# Patient Record
Sex: MALE | Race: WHITE | NOT HISPANIC OR LATINO | Employment: FULL TIME | URBAN - METROPOLITAN AREA
[De-identification: names, ages, dates, MRNs, and addresses within clinical notes are randomized per-mention and may not be internally consistent; named-entity substitution may affect disease eponyms.]

---

## 2021-03-31 NOTE — TELEPHONE ENCOUNTER
MEDICAL RECORDS REQUEST   [facility]  Urology Clinic  [address] [place] [address]  PHONE: [phone]  Fax: [phone]        FUTURE VISIT INFORMATION                                                   Thomas Winstonsj, : 1965 scheduled for future visit at [facility]    APPOINTMENT INFORMATION:    Date: 5/3/21    Provider:  Neo LEWIS    Reason for Visit/Diagnosis: Artificial Urinary Sphincter (AUS)    REFERRAL INFORMATION:    Referring provider:  Self    Specialty: N/A    Referring providers clinic:  N/A    Clinic contact number:  N/A    RECORDS REQUESTED FOR VISIT                                                     NOTES  STATUS/DETAILS   OFFICE NOTE from referring provider  no   OFFICE NOTE from other specialist  yes   DISCHARGE SUMMARY from hospital  yes   DISCHARGE REPORT from the ER  yes   OPERATIVE REPORT  Yes    MEDICATION LIST  Yes    PROSTATE CANCER     CT ABD PELVIS   Yes- 20, 20   BONE SCAN  Yes- 20   MRI OF PROSTATE  no   PATHOLOGY REPORT & SLIDES  Yes- CE 21   PSA (LAB)  yes     PRE-VISIT CHECKLIST      Record collection complete YES - Recs in CE to review   Images in  PACS     If no, please explain: CSS faxed to  to obtain recs/IMGS -3/31   Appointment appropriately scheduled           (right time/right provider) Yes   MyChart activation If no, please explain: in process    Questionnaire complete If no, please explain: In process      Completed by: Kelley Goldstein

## 2021-04-22 ENCOUNTER — PRE VISIT (OUTPATIENT)
Dept: UROLOGY | Facility: CLINIC | Age: 56
End: 2021-04-22

## 2021-04-22 NOTE — TELEPHONE ENCOUNTER
Reason for visit: AUS second opinion consult     Relevant information: retropubic prostatectomy, radiation    Problem list There is no problem list on file for this patient.      Records/imaging/labs/orders: records available    Pt called: yes, message left offering pt a video visit    At Rooming: standard

## 2021-04-26 ENCOUNTER — VIRTUAL VISIT (OUTPATIENT)
Dept: UROLOGY | Facility: CLINIC | Age: 56
End: 2021-04-26
Payer: COMMERCIAL

## 2021-04-26 DIAGNOSIS — N39.3 MALE URINARY STRESS INCONTINENCE: ICD-10-CM

## 2021-04-26 DIAGNOSIS — C61 MALIGNANT NEOPLASM OF PROSTATE (H): ICD-10-CM

## 2021-04-26 DIAGNOSIS — N32.0 BLADDER NECK CONTRACTURE: Primary | ICD-10-CM

## 2021-04-26 PROCEDURE — 99204 OFFICE O/P NEW MOD 45 MIN: CPT | Mod: 95 | Performed by: UROLOGY

## 2021-04-26 NOTE — NURSING NOTE
Chief Complaint   Patient presents with     Consult     Discuss AUS       No Known Allergies    No current outpatient medications on file.       Social History     Tobacco Use     Smoking status: Never Smoker     Smokeless tobacco: Never Used   Substance Use Topics     Alcohol use: None     Drug use: None       Stephanie Castillo LPN  4/26/2021  6:52 AM

## 2021-04-26 NOTE — LETTER
April 26, 2021       TO: Thomas Simon  [address] [place] [id number]       DearMr.Alfred,    We are writing to inform you of your test results.    {[facility] results letter list:[id number]}    No results found from the In Basket message.    ***

## 2021-04-26 NOTE — PATIENT INSTRUCTIONS
Follow up with Dr. Larson in June/July for a cystoscopy.      It was a pleasure meeting with you today.  Thank you for allowing me and my team the privilege of caring for you today.  YOU are the reason we are here, and I truly hope we provided you with the excellent service you deserve.  Please let us know if there is anything else we can do for you so that we can be sure you are leaving completely satisfied with your care experience.

## 2021-04-26 NOTE — PROGRESS NOTES
HPI:  Thomas Simon is a 56 year old male being seen for male VIKY after RP+RT (PSA 15; April 2020;[facility]) with LIZET and positive margins now with biopsy proven recurrence. Now on Lupron with undetectable PSA.  Had dilation and TUIBN of VUAS in Jan and Feb of this year. Complete incontinence. Was doing well with incontinence right after surgery but got incontinent after the RT and had to start doing self dilation for VUAS. No longer doing self dilation. Had a cysto with Ector in March 16, 2021 with open bladder neck with some dystrophic calcification but able to get the scope through. Has an AUS set up with him for May.     The clamp keeps him dry 75% of the time, unless he bends over.     Reviewed previous notes from Dr. Ku and Ector as above.     Exam:  There were no vitals taken for this visit.  GENERAL: Healthy, alert and no distress  EYES: Eyes grossly normal to inspection.  No discharge or erythema, or obvious scleral/conjunctival abnormalities.  RESP: No audible wheeze, cough, or visible cyanosis.  No visible retractions or increased work of breathing.    SKIN: Visible skin clear. No significant rash, abnormal pigmentation or lesions.  NEURO: Cranial nerves grossly intact.  Mentation and speech appropriate for age.  PSYCH: Mentation appears normal, affect normal/bright, judgement and insight intact, normal speech and appearance well-groomed.    Review of Imaging:  The following imaging exams were independently viewed and interpreted by me and discussed with patient:  CT Scan Abd/Pelvis: Normal 12/28/2020  Bone Scan neg 4/9/2020    Review of Labs:  The following labs were reviewed by me and discussed with the patient:  Creatinine: normal in summer  PSA undetectable      Assessment & Plan     Prostate cancer (chronic illness with side effects from treatment including VUAS and VIKY)-- on Lupron now with undetectable PSA; no change planned.  Vesicourethral anastomotic stenosis -- mild recurrence noted by  [facility] one month after most recent TUI in Feb. I advised patient to repeat cysto 4-6 months after last TUI to confirm stable VUAS before proceeding with AUS  Male VIKY -- could benefit from AUS but in my opinion needs a stably open VUAS for 6 months first. He understands the risks of AUS placement to include but not be limited to bleeding, infection, penile or scrotal pain/numbness, device infection or erosion, urethral atrophy and need for revision or replacement of the artifical sphincter (25%). He understands that I have consulted for [employer], the  of the device.We discussed that his risks would be elevated compared to the average patient given his prior radiation.    He has elected to postpone the May AUS and do a cysto this summer.     Requests disability paperwork and I asked him to reach out to Dr Ku or his PCP Dr Benz for that.     Jack Larson MD  [facility] UROLOGY [facility]          ==========================    Video Visit Technology for this patient: Bronwyn Video Visit- Patient was left in waiting room    Thomas is a 56 year old who is being evaluated via a billable video visit.      How would you like to obtain your AVS? Mail a copy  If the video visit is dropped, the invitation should be resent by: Text to cell phone: [phone]  Will anyone else be joining your video visit? No    Video Start Time: 7:00  Video-Visit Details    Type of service:  Video Visit    Video End Time:7:31 AM    Originating Location (pt. Location): Home    Distant Location (provider location):  [facility] UROLOGY [facility]     Platform used for Video Visit: Bronwyn

## 2021-04-26 NOTE — LETTER
4/26/2021       RE: Thomas Simon  [address] [place] [id number]     Dear Colleague,    Thank you for referring your patient, Thomas Simon, to the [facility] UROLOGY CLINIC [place] [facility]. Please see a copy of my visit note below.    HPI:  Thomas Simon is a 56 year old male being seen for male VIKY after RP+RT (PSA 15; April 2020;[place]) with LIZET and positive margins now with biopsy proven recurrence. Now on Lupron with undetectable PSA.  Had dilation and TUIBN of VUAS in Jan and Feb of this year. Complete incontinence. Was doing well with incontinence right after surgery but got incontinent after the RT and had to start doing self dilation for VUAS. No longer doing self dilation. Had a cysto with Ector in March 16, 2021 with open bladder neck with some dystrophic calcification but able to get the scope through. Has an AUS set up with him for May.     The clamp keeps him dry 75% of the time, unless he bends over.     Reviewed previous notes from Dr. Ku and Ector as above.     Exam:  There were no vitals taken for this visit.  GENERAL: Healthy, alert and no distress  EYES: Eyes grossly normal to inspection.  No discharge or erythema, or obvious scleral/conjunctival abnormalities.  RESP: No audible wheeze, cough, or visible cyanosis.  No visible retractions or increased work of breathing.    SKIN: Visible skin clear. No significant rash, abnormal pigmentation or lesions.  NEURO: Cranial nerves grossly intact.  Mentation and speech appropriate for age.  PSYCH: Mentation appears normal, affect normal/bright, judgement and insight intact, normal speech and appearance well-groomed.    Review of Imaging:  The following imaging exams were independently viewed and interpreted by me and discussed with patient:  CT Scan Abd/Pelvis: Normal 12/28/2020  Bone Scan neg 4/9/2020    Review of Labs:  The following labs were reviewed by me and  discussed with the patient:  Creatinine: normal in summer  PSA undetectable      Assessment & Plan     Prostate cancer (chronic illness with side effects from treatment including VUAS and VIKY)-- on Lupron now with undetectable PSA; no change planned.  Vesicourethral anastomotic stenosis -- mild recurrence noted by Ector one month after most recent TUI in Feb. I advised patient to repeat cysto 4-6 months after last TUI to confirm stable VUAS before proceeding with AUS  Male VIKY -- could benefit from AUS but in my opinion needs a stably open VUAS for 6 months first. He understands the risks of AUS placement to include but not be limited to bleeding, infection, penile or scrotal pain/numbness, device infection or erosion, urethral atrophy and need for revision or replacement of the artifical sphincter (25%). He understands that I have consulted for [employer], the  of the device.We discussed that his risks would be elevated compared to the average patient given his prior radiation.    He has elected to postpone the May AUS and do a cysto this summer.     Requests disability paperwork and I asked him to reach out to Dr Ku or his PCP Dr Benz for that.     Jack Larson MD  [facility] UROLOGY [facility]    ==========================    Video Visit Technology for this patient: Well Video Visit- Patient was left in waiting room    Thomas is a 56 year old who is being evaluated via a billable video visit.      How would you like to obtain your AVS? Mail a copy  If the video visit is dropped, the invitation should be resent by: Text to cell phone: [phone]  Will anyone else be joining your video visit? No    Video Start Time: 7:00  Video-Visit Details    Type of service:  Video Visit    Video End Time:7:31 AM    Originating Location (pt. Location): Home    Distant Location (provider location):  [facility]OLOG[facility]     Platform used for  Video Visit: Bronwyn

## 2021-05-03 ENCOUNTER — PRE VISIT (OUTPATIENT)
Dept: UROLOGY | Facility: CLINIC | Age: 56
End: 2021-05-03

## 2021-07-15 ENCOUNTER — PRE VISIT (OUTPATIENT)
Dept: UROLOGY | Facility: CLINIC | Age: 56
End: 2021-07-15

## 2021-07-15 NOTE — TELEPHONE ENCOUNTER
Reason for visit: Cystoscopy      Relevant information: AUS planning    Problem list There is no problem list on file for this patient.      Records/imaging/labs/orders: all records available    Pt called: no need for a call    At Rooming: standard

## 2021-07-19 ENCOUNTER — OFFICE VISIT (OUTPATIENT)
Dept: UROLOGY | Facility: CLINIC | Age: 56
End: 2021-07-19
Payer: COMMERCIAL

## 2021-07-19 VITALS — DIASTOLIC BLOOD PRESSURE: 85 MMHG | HEART RATE: 89 BPM | SYSTOLIC BLOOD PRESSURE: 132 MMHG

## 2021-07-19 DIAGNOSIS — N39.3 MALE URINARY STRESS INCONTINENCE: Primary | ICD-10-CM

## 2021-07-19 DIAGNOSIS — Z01.812 PRE-PROCEDURE LAB EXAM: Primary | ICD-10-CM

## 2021-07-19 PROCEDURE — 99207 PR NO CHARGE NURSE ONLY: CPT

## 2021-07-19 PROCEDURE — 52000 CYSTOURETHROSCOPY: CPT | Performed by: UROLOGY

## 2021-07-19 RX ORDER — CEFAZOLIN SODIUM 2 G/50ML
2 SOLUTION INTRAVENOUS
Status: CANCELLED | OUTPATIENT
Start: 2021-07-19

## 2021-07-19 RX ORDER — CEFAZOLIN SODIUM 2 G/50ML
2 SOLUTION INTRAVENOUS SEE ADMIN INSTRUCTIONS
Status: CANCELLED | OUTPATIENT
Start: 2021-07-19

## 2021-07-19 ASSESSMENT — PAIN SCALES - GENERAL: PAINLEVEL: NO PAIN (0)

## 2021-07-19 NOTE — NURSING NOTE
Pre Op Teaching Flowsheet                                        Pre and Post op Patient Education  Relevant Diagnosis: stress incontinentence   Teaching Topic:  Pre and post op teaching for AUS  Person Involved in teaching:patient      Motivation Level: High  Asks Questions: Yes  Eager to Learn:  Yes  Cooperative: Yes  Receptive (willing/able to accept information):  Yes  Patient demonstrates understanding of the following:  Date and time of surgery:  09/30/21  [facility]  History and Physical and any other testing necessary prior to surgery [facility]   Required time line for completion of History and Physical and any pre-op testing: No more than 30 days prior to surgery date    NPO Guidelines: NPO per Anesthesia Guidelines : Reviewed (surgery packet for further information).    Patient demonstrates understanding of the following:  Patient understands the need for a responsible adult to drive them home and someone to stay with them for the first 24 hours post-operatively: Wife  Pre-op bowel prep: N/A  Pre-op showering/scrub information with Hibiclens Soap: Yes given soap  Medications to take the day of surgery: Pre op Physical for instruction.   Blood thinner medications discussed and when to stop (if applicable):  Yes  Diabetes medication management (if applicable):  Patient to discuss with Primary Care Provider  Discussed pain control after surgery: pain scale, pain medications and pain management techniques  Infection Prevention: Patient demonstrates understanding of the following:  Patient instructed on hand hygiene:  Yes  Surgical procedure site care taught: Yes  Signs and symptoms of infection taught:  Yes  Wound care will be taught at the time of discharge.    Urine anylisis and Urine Culture [facility]  Pre op Covid testing on:TBD  Post-op follow-up:3-4 weeks for activation  Discussed how to contact the hospital, nurse, and clinic scheduling staff if  necessary.     Surgical instructions given to patient in clinic:in clinic to call with surgery date.   Instructional materials used/given/mailed: Before your surgery packet , Medications to avoid before surgery , Showering or Bathing instructions before surgery  and What to expect after surgery    Total time with patient: 10 minutes   Janneth Dailey RN

## 2021-07-19 NOTE — NURSING NOTE
Chief Complaint   Patient presents with     Cystoscopy     incontinence, AUS planning       Blood pressure 132/85, pulse 89. There is no height or weight on file to calculate BMI.    There is no problem list on file for this patient.      No Known Allergies    No current outpatient medications on file.       Social History     Tobacco Use     Smoking status: Never Smoker     Smokeless tobacco: Never Used   Substance Use Topics     Alcohol use: None     Drug use: None       Ale Sparrow CMA  2021  4:09 PM     Invasive Procedure Safety Checklist:    Procedure: Cystoscopy     Action: Complete sections and checkboxes as appropriate.    Pre-procedure:  1. Patient ID Verified with 2 identifiers ([employer] and  or MRN) : YES    2. Procedure and site verified with patient/designee (when able) : YES    3. Accurate consent documentation in medical record : YES    4. H&P (or appropriate assessment) documented in medical record : YES  H&P must be up to 30 days prior to procedure an updated within 24 hours of                 Procedure as applicable.     5. Relevant diagnostic and radiology test results appropriately labeled and displayed as applicable : YES    6. Blood products, implants, devices, and/or special equipment available for the procedure as applicable : YES    7. Procedure site(s) marked with provider initials [Exclusions: None] : NO    8. Marking not required. Reason : Yes  Procedure does not require site marking    Time Out:     Time-Out performed immediately prior to starting procedure, including verbal and active participation of all team members addressing: YES    1. Correct patient identity.  2. Confirmed that the correct side and site are marked.  3. An accurate procedure to be done.  4. Agreement on the procedure to be done.  5. Correct patient position.  6. Relevant images and results are properly labeled and appropriately displayed.  7. The need to administer antibiotics or fluids for irrigation  purposes during the procedure as applicable.  8. Safety precautions based on patient history or medication use.    During Procedure: Verification of correct person, site, and procedure occurs any time the responsibility for care of the patient is transferred to another member of the care team.    No medications administered during this procedure.    Ale Sparrow CMA  July 19, 2021

## 2021-07-19 NOTE — PROGRESS NOTES
Male Cystoscopy Procedure Note     PRE-PROCEDURE DIAGNOSIS: male stress urinary incontinence and h/o bladder neck contracture  POST-PROCEDURE DIAGNOSIS: male stress urinary incontinence and h/o bladder neck contracture  PROCEDURE: cystoscopy    HISTORY: Thomas Simon is a 56 year old man with male stress urinary incontinence and h/o bladder neck contracture.     REVIEW OF OFFICE STUDIES:    none    DESCRIPTION OF PROCEDURE:  After informed consent was obtained, the patient was brought to the procedure room where he was placed in the supine position with all pressure points well padded.  The penis and scrotum were prepped and draped in a sterile fashion. A flexible cystoscope was introduced through a well-lubricated urethra.  The anterior urethra was free of stricture. The urinary sphincter was very weak. The prostate demonstrated removal. Bladder neck was open to 14F. There was very mild dystrophic calcification .The flexible cystoscope was removed and the findings were described to the patient.   ASSESSMENT AND PLAN:  56 year old man with male VIKY. The BNC appears stable and reasonably open.  Okay to proceed with AUS.  He understands the risks of AUS placement to include but not be limited to bleeding, infection, penile or scrotal pain/numbness, device infection or erosion, urethral atrophy and need for revision or replacement of the artifical sphincter (25%). He understands that I have consulted for [employer], the  of the device.

## 2021-07-19 NOTE — LETTER
7/19/2021       RE: Thomas Simon  [address] [address]     Dear Colleague,    Thank you for referring your patient, Thomas Simon, to the [facility]OLOG[facility] CLINIC [facility]. Please see a copy of my visit note below.      Male Cystoscopy Procedure Note     PRE-PROCEDURE DIAGNOSIS: male stress urinary incontinence and h/o bladder neck contracture  POST-PROCEDURE DIAGNOSIS: male stress urinary incontinence and h/o bladder neck contracture  PROCEDURE: cystoscopy    HISTORY: Thomas Simon is a 56 year old man with male stress urinary incontinence and h/o bladder neck contracture.     REVIEW OF OFFICE STUDIES:    none    DESCRIPTION OF PROCEDURE:  After informed consent was obtained, the patient was brought to the procedure room where he was placed in the supine position with all pressure points well padded.  The penis and scrotum were prepped and draped in a sterile fashion. A flexible cystoscope was introduced through a well-lubricated urethra.  The anterior urethra was free of stricture. The urinary sphincter was very weak. The prostate demonstrated removal. Bladder neck was open to 14F. There was very mild dystrophic calcification .The flexible cystoscope was removed and the findings were described to the patient.   ASSESSMENT AND PLAN:  56 year old man with male VIKY. The BNC appears stable and reasonably open.  Okay to proceed with AUS.  He understands the risks of AUS placement to include but not be limited to bleeding, infection, penile or scrotal pain/numbness, device infection or erosion, urethral atrophy and need for revision or replacement of the artifical sphincter (25%). He understands that I have consulted for [employer], the  of the device.      Again, thank you for allowing me to participate in the care of your patient.      Sincerely,    Jack Larson MD

## 2021-07-21 DIAGNOSIS — Z11.59 ENCOUNTER FOR SCREENING FOR OTHER VIRAL DISEASES: ICD-10-CM

## 2021-07-21 PROBLEM — N39.3 MALE URINARY STRESS INCONTINENCE: Status: ACTIVE | Noted: 2021-07-21

## 2021-07-24 DIAGNOSIS — Z11.59 ENCOUNTER FOR SCREENING FOR OTHER VIRAL DISEASES: ICD-10-CM

## 2021-07-27 ENCOUNTER — TELEPHONE (OUTPATIENT)
Dept: UROLOGY | Facility: CLINIC | Age: 56
End: 2021-07-27

## 2021-07-27 NOTE — TELEPHONE ENCOUNTER
Patient is scheduled for surgery with Dr. Larson     Spoke with: Dr. Larson     Date of Surgery: Thursday 09/30/21    Location: [place] OR      Informed patient they will need an adult  Yes    Pre op with Provider pedro    H&P: Scheduled with [facility]     Pre-procedure COVID-19 Test: Patient will be calling and scheduling [facility] or a [facility] by his house.     Additional imaging/appointments: 3 week post op in person nicole/Kecia Monday 10/18/21 @ 12:15pm    Surgery packet: patient received packet in person when he had a clinic visit on 07/19/21, patient declined a new surgery packet to be mailed.      Additional comments: patient declined sooner offered surgery dates. Requested end of September as he has previous plans.

## 2021-09-09 ENCOUNTER — PATIENT OUTREACH (OUTPATIENT)
Dept: UROLOGY | Facility: CLINIC | Age: 56
End: 2021-09-09

## 2021-09-09 ENCOUNTER — TELEPHONE (OUTPATIENT)
Dept: UROLOGY | Facility: CLINIC | Age: 56
End: 2021-09-09

## 2021-09-09 NOTE — TELEPHONE ENCOUNTER
HUSSAIN faxed UC orders to [facility] at [phone], on 9/9/21 at 11:30AM per pt request.

## 2021-09-09 NOTE — TELEPHONE ENCOUNTER
----- Message from Janneth Dailey RN sent at 9/9/2021 11:17 AM CDT -----  Regarding: Fax UC order  Please fax order to .    Thank you    Janneth

## 2021-11-08 DIAGNOSIS — Z01.812 PRE-PROCEDURE LAB EXAM: Primary | ICD-10-CM

## 2021-11-30 ENCOUNTER — PATIENT OUTREACH (OUTPATIENT)
Dept: UROLOGY | Facility: CLINIC | Age: 56
End: 2021-11-30
Payer: COMMERCIAL

## 2021-12-05 DIAGNOSIS — Z11.59 ENCOUNTER FOR SCREENING FOR OTHER VIRAL DISEASES: ICD-10-CM

## 2021-12-28 ENCOUNTER — PATIENT OUTREACH (OUTPATIENT)
Dept: UROLOGY | Facility: CLINIC | Age: 56
End: 2021-12-28
Payer: COMMERCIAL

## 2021-12-29 ENCOUNTER — ANESTHESIA EVENT (OUTPATIENT)
Dept: SURGERY | Facility: CLINIC | Age: 56
End: 2021-12-29
Payer: COMMERCIAL

## 2021-12-29 RX ORDER — GABAPENTIN 300 MG/1
300 CAPSULE ORAL ONCE
Status: CANCELLED | OUTPATIENT
Start: 2021-12-29 | End: 2021-12-29

## 2021-12-29 NOTE — ANESTHESIA PREPROCEDURE EVALUATION
Anesthesia Pre-Procedure Evaluation    Patient: Thomas Simon   MRN: [id number] : 1965        Preoperative Diagnosis: Male urinary stress incontinence [N39.3]    Procedure : Procedure(s):  INSERTION, ARTIFICIAL URINARY SPHINCTER          History reviewed. No pertinent past medical history.   History reviewed. No pertinent surgical history.   No Known Allergies   Social History     Tobacco Use     Smoking status: Never Smoker     Smokeless tobacco: Never Used   Substance Use Topics     Alcohol use: Not Currently      Wt Readings from Last 1 Encounters:   No data found for Wt        Anesthesia Evaluation   Pt has had prior anesthetic. Type: General and MAC.        ROS/MED HX  ENT/Pulmonary:  - neg pulmonary ROS     Neurologic:  - neg neurologic ROS     Cardiovascular:  - neg cardiovascular ROS     METS/Exercise Tolerance: 4 - Raking leaves, gardening    Hematologic:  - neg hematologic  ROS     Musculoskeletal:  - neg musculoskeletal ROS     GI/Hepatic:  - neg GI/hepatic ROS     Renal/Genitourinary: Comment: HX of BPH and prostate cancer s/p prostatectomy 2020      Endo:  - neg endo ROS     Psychiatric/Substance Use:  - neg psychiatric ROS     Infectious Disease:  - neg infectious disease ROS     Malignancy:       Other:            Physical Exam    Airway        Mallampati: III   TM distance: > 3 FB   Neck ROM: full   Mouth opening: > 3 cm    Respiratory Devices and Support         Dental  no notable dental history         Cardiovascular          Rhythm and rate: regular and normal     Pulmonary           breath sounds clear to auscultation           OUTSIDE LABS:  CBC: No results found for: WBC, HGB, HCT, PLT  BMP: No results found for: NA, POTASSIUM, CHLORIDE, CO2, BUN, CR, GLC  COAGS: No results found for: PTT, INR, FIBR  POC: No results found for: BGM, HCG, HCGS  HEPATIC: No results found for: ALBUMIN, PROTTOTAL, ALT, AST, GGT, ALKPHOS, BILITOTAL, BILIDIRECT, SAVANNAH  OTHER: No results found for: PH, LACT,  A1C, ASHOK, PHOS, MAG, LIPASE, AMYLASE, TSH, T4, T3, CRP, SED    Anesthesia Plan    ASA Status:  2   NPO Status:  NPO Appropriate    Anesthesia Type: General.     - Airway: ETT   Induction: Intravenous.   Maintenance: Balanced.        Consents    Anesthesia Plan(s) and associated risks, benefits, and realistic alternatives discussed. Questions answered and patient/representative(s) expressed understanding.    - Discussed:     - Discussed with:  Patient      - Extended Intubation/Ventilatory Support Discussed: No.      - Patient is DNR/DNI Status: No    Use of blood products discussed: No .     Postoperative Care    Pain management: Multi-modal analgesia.   PONV prophylaxis: Ondansetron (or other 5HT-3), Dexamethasone or Solumedrol     Comments:                Franklyn Castro Junior, MD

## 2021-12-30 ENCOUNTER — HOSPITAL ENCOUNTER (OUTPATIENT)
Facility: CLINIC | Age: 56
Discharge: HOME | End: 2021-12-30
Attending: UROLOGY | Admitting: UROLOGY
Payer: COMMERCIAL

## 2021-12-30 ENCOUNTER — ANESTHESIA (OUTPATIENT)
Dept: SURGERY | Facility: CLINIC | Age: 56
End: 2021-12-30
Payer: COMMERCIAL

## 2021-12-30 VITALS
BODY MASS INDEX: 39.41 KG/M2 | DIASTOLIC BLOOD PRESSURE: 83 MMHG | HEIGHT: 74 IN | SYSTOLIC BLOOD PRESSURE: 137 MMHG | RESPIRATION RATE: 15 BRPM | TEMPERATURE: 97.7 F | HEART RATE: 104 BPM | WEIGHT: 307.1 LBS | OXYGEN SATURATION: 97 %

## 2021-12-30 DIAGNOSIS — N39.3 MALE URINARY STRESS INCONTINENCE: ICD-10-CM

## 2021-12-30 DIAGNOSIS — N39.3 MALE URINARY STRESS INCONTINENCE: Primary | ICD-10-CM

## 2021-12-30 LAB — GLUCOSE BLDC GLUCOMTR-MCNC: 110 MG/DL (ref 70–99)

## 2021-12-30 PROCEDURE — 250N000025 HC SEVOFLURANE, PER MIN: Performed by: UROLOGY

## 2021-12-30 PROCEDURE — 82962 GLUCOSE BLOOD TEST: CPT

## 2021-12-30 PROCEDURE — 250N000011 HC RX IP 250 OP 636

## 2021-12-30 PROCEDURE — 250N000009 HC RX 250

## 2021-12-30 PROCEDURE — 250N000011 HC RX IP 250 OP 636: Performed by: UROLOGY

## 2021-12-30 PROCEDURE — 258N000003 HC RX IP 258 OP 636

## 2021-12-30 PROCEDURE — 710N000012 HC RECOVERY PHASE 2, PER MINUTE: Performed by: UROLOGY

## 2021-12-30 PROCEDURE — 258N000003 HC RX IP 258 OP 636: Performed by: ANESTHESIOLOGY

## 2021-12-30 PROCEDURE — 360N000076 HC SURGERY LEVEL 3, PER MIN: Performed by: UROLOGY

## 2021-12-30 PROCEDURE — 370N000017 HC ANESTHESIA TECHNICAL FEE, PER MIN: Performed by: UROLOGY

## 2021-12-30 PROCEDURE — 710N000009 HC RECOVERY PHASE 1, LEVEL 1, PER MIN: Performed by: UROLOGY

## 2021-12-30 PROCEDURE — 53445 INSERT URO/VES NCK SPHINCTER: CPT | Mod: GC | Performed by: UROLOGY

## 2021-12-30 PROCEDURE — 999N000141 HC STATISTIC PRE-PROCEDURE NURSING ASSESSMENT: Performed by: UROLOGY

## 2021-12-30 PROCEDURE — 272N000001 HC OR GENERAL SUPPLY STERILE: Performed by: UROLOGY

## 2021-12-30 PROCEDURE — 278N000051 HC OR IMPLANT GENERAL: Performed by: UROLOGY

## 2021-12-30 PROCEDURE — C1815 PROS, URINARY SPH, IMP: HCPCS | Performed by: UROLOGY

## 2021-12-30 PROCEDURE — 250N000013 HC RX MED GY IP 250 OP 250 PS 637: Performed by: ANESTHESIOLOGY

## 2021-12-30 PROCEDURE — 250N000013 HC RX MED GY IP 250 OP 250 PS 637

## 2021-12-30 DEVICE — OCCLUSIVE CUFF WITH INHIBIZONE
Type: IMPLANTABLE DEVICE | Site: URETHRA | Status: FUNCTIONAL
Brand: AMS 800 ARTIFICIAL URINARY SPHINCTER

## 2021-12-30 DEVICE — ACCESSORY KIT QUICK CONNECT WINDOW CONNECTORS (3 STRAIGHT, 2 RIGHT ANGLE, 1 Y), SUTURE-TIE CONNECTORS (3 STRAIGHT, 2 RIGHT ANGLE, 1 Y), 8 COLLETS, 1 COLLET HOLDER, 1 CUFF SIZER, 2 22-GAUGE BLUNT TIP NEEDLES, 2 15-GAUGE BLUNT TIP NEEDLES, 2 30 CM LENGTHS OF TUBING
Type: IMPLANTABLE DEVICE | Site: ABDOMEN | Status: FUNCTIONAL
Brand: AMS 800 ARTIFICIAL URINARY SPHINCTER

## 2021-12-30 DEVICE — CONTROL PUMP WITH INHIBIZONE
Type: IMPLANTABLE DEVICE | Site: SCROTUM | Status: FUNCTIONAL
Brand: AMS 800 ARTIFICIAL URINARY SPHINCTER

## 2021-12-30 RX ORDER — FENTANYL CITRATE 50 UG/ML
25 INJECTION, SOLUTION INTRAMUSCULAR; INTRAVENOUS EVERY 5 MIN PRN
Status: DISCONTINUED | OUTPATIENT
Start: 2021-12-30 | End: 2021-12-30 | Stop reason: HOSPADM

## 2021-12-30 RX ORDER — HYDROMORPHONE HCL IN WATER/PF 6 MG/30 ML
0.2 PATIENT CONTROLLED ANALGESIA SYRINGE INTRAVENOUS EVERY 5 MIN PRN
Status: DISCONTINUED | OUTPATIENT
Start: 2021-12-30 | End: 2021-12-30 | Stop reason: HOSPADM

## 2021-12-30 RX ORDER — CEFAZOLIN SODIUM 2 G/100ML
2 INJECTION, SOLUTION INTRAVENOUS
Status: COMPLETED | OUTPATIENT
Start: 2021-12-30 | End: 2021-12-30

## 2021-12-30 RX ORDER — ONDANSETRON 4 MG/1
4 TABLET, ORALLY DISINTEGRATING ORAL EVERY 30 MIN PRN
Status: DISCONTINUED | OUTPATIENT
Start: 2021-12-30 | End: 2021-12-30 | Stop reason: HOSPADM

## 2021-12-30 RX ORDER — ONDANSETRON 2 MG/ML
INJECTION INTRAMUSCULAR; INTRAVENOUS PRN
Status: DISCONTINUED | OUTPATIENT
Start: 2021-12-30 | End: 2021-12-30

## 2021-12-30 RX ORDER — DEXAMETHASONE SODIUM PHOSPHATE 4 MG/ML
INJECTION, SOLUTION INTRA-ARTICULAR; INTRALESIONAL; INTRAMUSCULAR; INTRAVENOUS; SOFT TISSUE PRN
Status: DISCONTINUED | OUTPATIENT
Start: 2021-12-30 | End: 2021-12-30

## 2021-12-30 RX ORDER — PROPOFOL 10 MG/ML
INJECTION, EMULSION INTRAVENOUS PRN
Status: DISCONTINUED | OUTPATIENT
Start: 2021-12-30 | End: 2021-12-30

## 2021-12-30 RX ORDER — LIDOCAINE HYDROCHLORIDE 20 MG/ML
INJECTION, SOLUTION INFILTRATION; PERINEURAL PRN
Status: DISCONTINUED | OUTPATIENT
Start: 2021-12-30 | End: 2021-12-30

## 2021-12-30 RX ORDER — CEFAZOLIN SODIUM IN 0.9 % NACL 3 G/100 ML
3 INTRAVENOUS SOLUTION, PIGGYBACK (ML) INTRAVENOUS SEE ADMIN INSTRUCTIONS
Status: DISCONTINUED | OUTPATIENT
Start: 2021-12-30 | End: 2021-12-30 | Stop reason: HOSPADM

## 2021-12-30 RX ORDER — OXYCODONE HYDROCHLORIDE 5 MG/1
5 TABLET ORAL EVERY 6 HOURS PRN
Qty: 20 TABLET | Refills: 0 | Status: SHIPPED | OUTPATIENT
Start: 2021-12-30 | End: 2022-01-04

## 2021-12-30 RX ORDER — ONDANSETRON 2 MG/ML
4 INJECTION INTRAMUSCULAR; INTRAVENOUS EVERY 30 MIN PRN
Status: DISCONTINUED | OUTPATIENT
Start: 2021-12-30 | End: 2021-12-30 | Stop reason: HOSPADM

## 2021-12-30 RX ORDER — ACETAMINOPHEN 325 MG/1
650 TABLET ORAL EVERY 4 HOURS PRN
Qty: 60 TABLET | Refills: 1 | Status: SHIPPED | OUTPATIENT
Start: 2021-12-30

## 2021-12-30 RX ORDER — CEFAZOLIN SODIUM 2 G/100ML
2 INJECTION, SOLUTION INTRAVENOUS SEE ADMIN INSTRUCTIONS
Status: DISCONTINUED | OUTPATIENT
Start: 2021-12-30 | End: 2021-12-30

## 2021-12-30 RX ORDER — OXYCODONE HYDROCHLORIDE 5 MG/1
5 TABLET ORAL EVERY 4 HOURS PRN
Status: DISCONTINUED | OUTPATIENT
Start: 2021-12-30 | End: 2021-12-31 | Stop reason: HOSPADM

## 2021-12-30 RX ORDER — LIDOCAINE 40 MG/G
CREAM TOPICAL
Status: DISCONTINUED | OUTPATIENT
Start: 2021-12-30 | End: 2021-12-30 | Stop reason: HOSPADM

## 2021-12-30 RX ORDER — ACETAMINOPHEN 325 MG/1
975 TABLET ORAL ONCE
Status: COMPLETED | OUTPATIENT
Start: 2021-12-30 | End: 2021-12-30

## 2021-12-30 RX ORDER — SODIUM CHLORIDE, SODIUM LACTATE, POTASSIUM CHLORIDE, CALCIUM CHLORIDE 600; 310; 30; 20 MG/100ML; MG/100ML; MG/100ML; MG/100ML
INJECTION, SOLUTION INTRAVENOUS CONTINUOUS
Status: DISCONTINUED | OUTPATIENT
Start: 2021-12-30 | End: 2021-12-30 | Stop reason: HOSPADM

## 2021-12-30 RX ORDER — AMOXICILLIN 250 MG
1 CAPSULE ORAL 2 TIMES DAILY PRN
Qty: 14 TABLET | Refills: 1 | Status: SHIPPED | OUTPATIENT
Start: 2021-12-30

## 2021-12-30 RX ORDER — FENTANYL CITRATE 50 UG/ML
INJECTION, SOLUTION INTRAMUSCULAR; INTRAVENOUS PRN
Status: DISCONTINUED | OUTPATIENT
Start: 2021-12-30 | End: 2021-12-30

## 2021-12-30 RX ADMIN — ACETAMINOPHEN 975 MG: 325 TABLET, FILM COATED ORAL at 06:07

## 2021-12-30 RX ADMIN — FENTANYL CITRATE 200 MCG: 50 INJECTION, SOLUTION INTRAMUSCULAR; INTRAVENOUS at 07:35

## 2021-12-30 RX ADMIN — FENTANYL CITRATE 50 MCG: 50 INJECTION, SOLUTION INTRAMUSCULAR; INTRAVENOUS at 08:20

## 2021-12-30 RX ADMIN — SUGAMMADEX 200 MG: 100 INJECTION, SOLUTION INTRAVENOUS at 09:50

## 2021-12-30 RX ADMIN — DEXAMETHASONE SODIUM PHOSPHATE 4 MG: 4 INJECTION, SOLUTION INTRA-ARTICULAR; INTRALESIONAL; INTRAMUSCULAR; INTRAVENOUS; SOFT TISSUE at 07:45

## 2021-12-30 RX ADMIN — ROCURONIUM BROMIDE 30 MG: 50 INJECTION, SOLUTION INTRAVENOUS at 07:48

## 2021-12-30 RX ADMIN — SUCCINYLCHOLINE CHLORIDE 100 MG: 20 INJECTION, SOLUTION INTRAMUSCULAR; INTRAVENOUS; PARENTERAL at 07:37

## 2021-12-30 RX ADMIN — HYDROMORPHONE HYDROCHLORIDE 0.2 MG: 0.2 INJECTION, SOLUTION INTRAMUSCULAR; INTRAVENOUS; SUBCUTANEOUS at 10:22

## 2021-12-30 RX ADMIN — CEFAZOLIN 3 G: 10 INJECTION, POWDER, FOR SOLUTION INTRAVENOUS at 07:28

## 2021-12-30 RX ADMIN — OXYCODONE HYDROCHLORIDE 5 MG: 5 TABLET ORAL at 10:40

## 2021-12-30 RX ADMIN — LIDOCAINE HYDROCHLORIDE 60 MG: 20 INJECTION, SOLUTION INFILTRATION; PERINEURAL at 07:52

## 2021-12-30 RX ADMIN — PHENYLEPHRINE HYDROCHLORIDE 100 MCG: 10 INJECTION INTRAVENOUS at 07:50

## 2021-12-30 RX ADMIN — SODIUM CHLORIDE, POTASSIUM CHLORIDE, SODIUM LACTATE AND CALCIUM CHLORIDE: 600; 310; 30; 20 INJECTION, SOLUTION INTRAVENOUS at 07:29

## 2021-12-30 RX ADMIN — PROPOFOL 200 MG: 10 INJECTION, EMULSION INTRAVENOUS at 07:36

## 2021-12-30 RX ADMIN — ONDANSETRON 4 MG: 2 INJECTION INTRAMUSCULAR; INTRAVENOUS at 09:44

## 2021-12-30 RX ADMIN — HYDROMORPHONE HYDROCHLORIDE 0.5 MG: 1 INJECTION, SOLUTION INTRAMUSCULAR; INTRAVENOUS; SUBCUTANEOUS at 09:42

## 2021-12-30 RX ADMIN — PHENYLEPHRINE HYDROCHLORIDE 150 MCG: 10 INJECTION INTRAVENOUS at 07:39

## 2021-12-30 RX ADMIN — HYDROMORPHONE HYDROCHLORIDE 0.2 MG: 0.2 INJECTION, SOLUTION INTRAMUSCULAR; INTRAVENOUS; SUBCUTANEOUS at 10:40

## 2021-12-30 RX ADMIN — PHENYLEPHRINE HYDROCHLORIDE 100 MCG: 10 INJECTION INTRAVENOUS at 08:26

## 2021-12-30 ASSESSMENT — MIFFLIN-ST. JEOR: SCORE: 2297.75

## 2021-12-30 NOTE — DISCHARGE INSTRUCTIONS
[facility]  Same-Day Surgery   Adult Discharge Orders & Instructions     For 24 hours after surgery    1. Get plenty of rest.  A responsible adult must stay with you for at least 24 hours after you leave the hospital.   2. Do not drive or use heavy equipment.  If you have weakness or tingling, don't drive or use heavy equipment until this feeling goes away.  3. Do not drink alcohol.  4. Avoid strenuous or risky activities.  Ask for help when climbing stairs.   5. You may feel lightheaded.  IF so, sit for a few minutes before standing.  Have someone help you get up.   6. If you have nausea (feel sick to your stomach): Drink only clear liquids such as apple juice, ginger ale, broth or 7-Up.  Rest may also help.  Be sure to drink enough fluids.  Move to a regular diet as you feel able.  7. You may have a slight fever. Call the doctor if your fever is over 100 F (37.7 C) (taken under the tongue) or lasts longer than 24 hours.  8. You may have a dry mouth, a sore throat, muscle aches or trouble sleeping.  These should go away after 24 hours.  9. Do not make important or legal decisions.   Call your doctor for any of the followin.  Signs of infection (fever, growing tenderness at the surgery site, a large amount of drainage or bleeding, severe pain, foul-smelling drainage, redness, swelling).    2. It has been over 8 to 10 hours since surgery and you are still not able to urinate (pass water).    3.  Headache for over 24 hours.    To contact a doctor, call Dr Raymundo's office at [phone] (clinic) (Monday-Friday 8:00 am-4:30 pm)   Or [phone] and ask for the resident on call for Urology (answered 24 hours a day)        Emergency Department:    [facility]: [phone]       (TTY for hearing impaired: [phone])    [facility]: [phone]       (TTY for hearing impaired: [phone])

## 2021-12-30 NOTE — PROVIDER NOTIFICATION
"Dr. Roy paged \" [place] [facility]; Alfred M: Pt reporting new numbness in Left Hand, wld like to speak to you when able. -[phone] or [phone]\"      Dr. Shah at bedside in  and notified of patient's new numbness in left thumb and three fingers. Dr. Shah assessed patient and patient okay to discharge to home. Dr. Pham at bedside in  and spoke with patient and assessed patient. Patient okay to discharge to home.   "

## 2021-12-30 NOTE — OP NOTE
OPERATIVE REPORT  12/30/2021      PREOPERATIVE DIAGNOSIS: male stress urinary incontinence.     POSTOPERATIVE DIAGNOSIS: male stress urinary incontinence.     PROCEDURES PERFORMED:   1. Artificial urinary sphincter placement. 4.0 cm cuff placed through perineal incision. 61-70 cm H2O pressure regulating balloon placed via right lower quadrant sub-rectus approach. Scrotal pump placed on patients right side. Connections made via subinguinal incision on right    SURGEON: Jack Larson MD   FELLOW: Kecia Briceño MD  RESIDENT: Troy Pham MD    ESTIMATED BLOOD LOSS: 10 mL.   COMPLICATIONS: none    TUBES AND DRAINS: None  FINDINGS: 3.5cm bulbar urethra, 4cm cuff placed.     MODIFIER 22: Significant perineal and abdominal adiposity and scarring from radiation making dissection difficult, TAKING 30 MIN LONGER THAN USUAL.     BRIEF HISTORY OF PRESENT ILLNESS: Thomas Simon is a 56 year old male with a history of prostate cancer s/p prostatectomy followed by EBRT and ADT with stress urinary incontinence refractory to minimally invasive management. The patient elected to have an artificial urethral sphincter. Risks, benefits and alternatives of AUS placement were discussed including but not limited to bleeding, infection, penile/scrotal pain/numbness, device erosion, urinary retention, persistent urinary incontinence and need for additional procedures. He has a known 12F membranous urethral stricture that has been stable over >6 months.     DESCRIPTION OF PROCEDURE: After informed consent was obtained and pre-operative antibiotics were given, the patient was brought to the operating room. Under general anesthesia he was placed in the low lithotomy position with all pressure points well-padded. The lower abdomen, penis, scrotum and perineum were prepped and draped in the standard sterile fashion. A surgical timeout was performed. The nursing team began preparing a 61-70 cm H2O PRB.     A flexible cystoscope was  advanced via meatus. The anterior urethra was normal. The 12F membranous stricture was again noted and was not passed. The scope was removed and a 12F ricardo placed without resistance to calibrate the stricture. This was removed and the stricture again evaluated with the scope, with no evidence of dilation. The scope was removed.     A vertical midline perineal incision was made and carried down through Colles fascia. There was significant perineal adiposity making this step take longer than normal. The bulbospongiosus muscles were divided in the midline and dissected off the corpus spongiosum. We circumferentially dissected the bulbar urethra off the underlying corporal bodies and encircled it with the measuring tape. The diameter measured comfortably at 3.5cm. A 4.cm cuff was selected and prepared.    A 12F ricardo catheter was placed and the bladder was drained. The catheter was subsequently removed.     A subinguinal incision was made lateral to the penis on the right side. Bovie was used to dissect down to the pubic bone, taking care to not injure the spermatic cord. We then attempted to create a space posterior to the pubic bone but this plane was obliterated by prior surgery and radiation. We thus elected to make a right lower quadrant incision superior to the inferior epigastric vessels on the right side. Bovie was used to dissect through scarpas to the abdominal wall fascia. Again significant adiposity added time to this step of the procedure. 0 vicryl stay sutures were placed on either side of a horizontal pat on the fascia totaling 4 sutures. The fascia was incised. The rectus muscles were spread with two Kellly clamps until the posterior sheath was visible. The posterior sheath was not violated.     The 61-70 cm H20 PRB was placed in this space in the deflated state then inflated with 22cc NS. The 4 0 vicryl sutures were tied down. The prepared cuff was then passed around the urethra with the tubing  exiting on the right side. A curved needle was used to pass the tubing from the cuff to the subinguinal incision and the PRB tubing from the RLQ incision down until all tubing was in the subinguinal incision.     A sub dartos pouch was created on the patient's right side using blunt dissection through the subinguinal wound. A single stitch of 3-0 vicryl was used to close the dartos pouch. Connections were then all done in the standard fashion in the subinguinal wound.     The subinguinal wound was closed in 2 layers with 3-0 Vicryl on Adore's fascia and a 4-0 Monocryl subcuticular stitch. The right lower quadrant incision was closed in a similar fashion. Dermabond was applied. The perineal wound was closed in 2 layers with 3-0 Vicryl on Colle's fascia and 4-0 Monocryl running horizontal mattress on the skin. Bacitracin, fluffs and a scrotal support were applied. The cuff was left deactivated.     The patient was awoken from general anesthesia and transferred to the recovery room in stable condition.      Body mass index is 39.43 kg/m .    As the attending surgeon I, Jack Larson, was present and scrubbed throughout the procedure.

## 2021-12-30 NOTE — ANESTHESIA POSTPROCEDURE EVALUATION
"Patient: Thomas Simon    Procedure: Procedure(s):  INSERTION, ARTIFICIAL URINARY SPHINCTER  Cystoscopy flexible       Diagnosis:Male urinary stress incontinence [N39.3]  Diagnosis Additional Information: No value filed.    Anesthesia Type:  General    Note:  Disposition: Outpatient   Postop Pain Control: Uneventful            Sign Out: Well controlled pain   PONV: No   Neuro/Psych: Uneventful            Sign Out: Acceptable/Baseline neuro status   Airway/Respiratory: Uneventful            Sign Out: Acceptable/Baseline resp. status   CV/Hemodynamics: Uneventful            Sign Out: Acceptable CV status; No obvious hypovolemia; No obvious fluid overload   Other NRE: NONE   DID A NON-ROUTINE EVENT OCCUR? YES    Event details/Postop Comments:  Patient with complaint of \"numbness\" in left hand involving palmar portion of left thumb to thenar eminence, and the distal tips of the first and second digits. No reported weakness.    Patient is able to discriminate touch and blunt needle sensation, but with diminished sensation versus right. No discoloration vs right hand. Cap refill < 2s. Strength 5/5 with hand squeeze, wrist extension, flexion bilaterally.    Intraoperatively, the patient was in some Trendelenburg position, with the arm strapped and an ABD padding the strap area. Additionally, the strap securing the arm was on the patient's forearm. BP cuff on the right arm. PIV/pulse ox on left. Patient's left extremity and hand/wrist was in neutral position on the arm board. The arms were checked multiple times by the resident, per his discussion with me.    This distribution of decreased sensation suggests possible median nerve injury, but is atypical since it does not involve the whole first and second digit and the rest of the palm. Strength is normal. I discussed that the sensation should resolve in the next several days, though it may also last longer. If any discoloration or worsening of the decreased sensation " occurs in the hand, to have it evaluated urgently.    Urology team also notified and is at bedside.    Addendum: in discussion with the PACU nurse (who initially notified me of the patient's sensory changes), the patient had only started to report sensory changes as he was leaving the PACU and had not mentioned it before. ?possible unmasking of carpel tunnel syndrome           Last vitals:  Vitals Value Taken Time   /56 12/30/21 1030   Temp 36.3  C (97.34  F) 12/30/21 1037   Pulse 106 12/30/21 1033   Resp 10 12/30/21 1033   SpO2 97 % 12/30/21 1037   Vitals shown include unvalidated device data.    Electronically Signed By: Zafar Shah MD  December 30, 2021  10:38 AM

## 2021-12-30 NOTE — ANESTHESIA CARE TRANSFER NOTE
Patient: Thomas Simon    Procedure: Procedure(s):  INSERTION, ARTIFICIAL URINARY SPHINCTER  Cystoscopy flexible       Diagnosis: Male urinary stress incontinence [N39.3]  Diagnosis Additional Information: No value filed.    Anesthesia Type:   General     Note:    Oropharynx: spontaneously breathing  Level of Consciousness: awake  Oxygen Supplementation: face mask  Level of Supplemental Oxygen (L/min / FiO2): 6  Independent Airway: airway patency satisfactory and stable  Dentition: dentition unchanged  Vital Signs Stable: post-procedure vital signs reviewed and stable  Report to RN Given: handoff report given  Patient transferred to: PACU    Handoff Report: Identifed the Patient, Identified the Reponsible Provider, Reviewed the pertinent medical history, Discussed the surgical course, Reviewed Intra-OP anesthesia mangement and issues during anesthesia, Set expectations for post-procedure period and Allowed opportunity for questions and acknowledgement of understanding      Vitals:  Vitals Value Taken Time   BP     Temp     Pulse     Resp     SpO2         Electronically Signed By: Franklyn Castro Junior, MD  December 30, 2021  10:13 AM

## 2021-12-30 NOTE — ANESTHESIA PROCEDURE NOTES
Airway       Patient location during procedure: OR       Procedure Start/Stop Times: 12/30/2021 7:38 AM  Staff -        Anesthesiologist:  Zafar Shah MD       Resident/Fellow: Franklyn Ramos Jr., MD       Performed By: resident  Consent for Airway        Urgency: elective  Indications and Patient Condition       Indications for airway management: chanel-procedural       Induction type:intravenous       Mask difficulty assessment: 1 - vent by mask    Final Airway Details       Final airway type: endotracheal airway       Successful airway: ETT - single and Oral  Endotracheal Airway Details        ETT size (mm): 7.5       Cuffed: yes       Successful intubation technique: direct laryngoscopy       DL Blade Type: MAC 4       Grade View of Cords: 1       Adjucts: stylet       Position: Right       Measured from: lips       Secured at (cm): 24       Bite block used: None    Post intubation assessment        Placement verified by: capnometry, equal breath sounds and chest rise        Number of attempts at approach: 1       Secured with: pink tape       Ease of procedure: easy       Dentition: Unchanged

## 2021-12-30 NOTE — BRIEF OP NOTE
[facility]    Brief Operative Note    Pre-operative diagnosis: Male urinary stress incontinence [N39.3]  Post-operative diagnosis Same as pre-operative diagnosis    Procedure: Procedure(s):  INSERTION, ARTIFICIAL URINARY SPHINCTER  Cystoscopy flexible  Surgeon: Surgeon(s) and Role:     * Jack Larson MD - Primary     * Troy Pham MD - Resident - Assisting     * Kecia Potts MD - Fellow - Assisting  Anesthesia: General   Estimated Blood Loss: 10 mL    Drains: None  Specimens: * No specimens in log *  Findings:   approx 10 Fr membranous urethral stricture but 12 Fr silicone ricardo passed atraumatically; AUS placed with 4 cm cuff and ectopic PRB in the right submuscular space.  Complications: None.  Implants:   Implant Name Type Inv. Item Serial No.  Lot No. LRB No. Used Action   IMP URINARY SPHINCTER  PUMP SYSTEM [id number] - [id number] Prosthesis IMP URINARY SPHINCTER  PUMP SYSTEM [id number]  [employer] [id number] N/A 1 Implanted   IMP URINARY SPHINCTER BALLOON AMS 61-70CM [id number] - [id number] Metallic Hardware/[employer] IMP URINARY SPHINCTER BALLOON AMS 61-70CM [id number]  [employer] [id number] N/A 1 Implanted   KIT [employer]ESSORY [facility]  832634-56 - [id number] Leads [employer] ACCESSORY [facility]  [id number]  [employer] [id number] N/A 1 Implanted   IMP URINARY SPHINCTER CUFF OCCLUSIVE AMS 4.0CM [id number] - [id number] Prosthesis IMP URINARY SPHINCTER CUFF OCCLUSIVE AMS 4.0CM [id number]  [employer] [id number] N/A 1 Implanted     Post-op plan:  -Discharge home once PACU criteria are met  -Device is DE-activated (urinary leakage and incontinence expected)  -No cunningham clamp post-operatively until seen in clinic for follow up    Troy Pham MD  Urology, PGY-4  (p) [phone]

## 2021-12-31 NOTE — ANESTHESIA POSTPROCEDURE EVALUATION
Patient: Thomas Simon    Procedure: Procedure(s):  INSERTION, ARTIFICIAL URINARY SPHINCTER  Cystoscopy flexible       Diagnosis:Male urinary stress incontinence [N39.3]  Diagnosis Additional Information: No value filed.    Anesthesia Type:  General    Note:     Postop Pain Control:    PONV:    Neuro/Psych:    Airway/Respiratory:    CV/Hemodynamics:    Other NRE:    DID A NON-ROUTINE EVENT OCCUR? YES    Event details/Postop Comments:  Patient complaining of numbness over the left hand, palmar portion of left thumb to thenar eminence, and the distal tips of the first and second digits. Denies weakness. Strength over the left-sided upper limb is preserved.    Before and during the procedure his arms were placed on padded arm boards and strapped ( forearm) using an ABD padding the strap area and ultimately  were abducted to less than 90 degrees. Pressure points were checked regularly at 10 to 20 minutes throughout the case to ensure that the shoulders were not over-abducted, that the elbows were not over-extended, and that the arms were loose underneath the strap but secure enough to not be able to fall off of the arm boards. As resident I have taken all standard measures regarding correct patient positioning.    During the surgery, the patient was moved from supine to Trendelenburg position and he tolerated the procedure without hemodynamic instability or other incident and was extubated and taken to the PACU.  Nurse in the PACU said that the patient had no complaints while he was there just reporting the issue when he got back to the preop area.    Dr Shah was called by the preop nurse to assess the patient and found that he was able to discriminate touch and blunt needle sensation, strength is preserved,, no signs of discoloration, and cap refill < 2 seconds. Although the distribution of the decreased sensation suggests possible median nerve injury the presentation is atypical. He discussed the case with the patient  and gave to him all the medical recommendations as far as the steps to be taken afterwards.           Last vitals:  Vitals Value Taken Time   /64 12/30/21 1045   Temp 36.5  C (97.7  F) 12/30/21 1055   Pulse 101 12/30/21 1051   Resp 24 12/30/21 1045   SpO2 97 % 12/30/21 1059   Vitals shown include unvalidated device data.    Electronically Signed By: Franklyn Castro Junior, MD  December 30, 2021  9:03 PM

## 2022-01-04 ENCOUNTER — PATIENT OUTREACH (OUTPATIENT)
Dept: UROLOGY | Facility: CLINIC | Age: 57
End: 2022-01-04
Payer: COMMERCIAL

## 2022-01-04 NOTE — TELEPHONE ENCOUNTER
Patient requesting additional letter for insurance company  [employer] contacted to assist patient.  Janneth Dailey RN

## 2022-01-07 NOTE — PROGRESS NOTES
Anesthesiology progress note    Patient was contacted today to follow up on his left hand. He reports that the decreased sensation completely resolved the morning after, with no changes in strength. His left hand functionality is also at baseline.    He reports that he has had a history of having transient numbness/sensory changes to the middle two fingers, especially after waking up after sleeping and did inquire whether it would be from carpel tunnel issues, which was never looked at. I did discuss that considering the previous history of having sensory changes in his hands, the sensory changes seen perioperatively could have been an exacerbation of an undiagnosed carpel tunnel syndrome and to discuss this with his primary care doctor the next time he saw them for treatment options or a referral for evaluation and treatment.    Zafar Shah MD  Anesthesiology

## 2022-01-24 ENCOUNTER — OFFICE VISIT (OUTPATIENT)
Dept: UROLOGY | Facility: CLINIC | Age: 57
End: 2022-01-24
Payer: COMMERCIAL

## 2022-01-24 VITALS
HEART RATE: 103 BPM | BODY MASS INDEX: 39.4 KG/M2 | WEIGHT: 307 LBS | SYSTOLIC BLOOD PRESSURE: 128 MMHG | HEIGHT: 74 IN | DIASTOLIC BLOOD PRESSURE: 86 MMHG

## 2022-01-24 DIAGNOSIS — N39.3 MALE URINARY STRESS INCONTINENCE: Primary | ICD-10-CM

## 2022-01-24 PROCEDURE — 99024 POSTOP FOLLOW-UP VISIT: CPT | Performed by: STUDENT IN AN ORGANIZED HEALTH CARE EDUCATION/TRAINING PROGRAM

## 2022-01-24 ASSESSMENT — MIFFLIN-ST. JEOR: SCORE: 2297.29

## 2022-01-24 ASSESSMENT — PAIN SCALES - GENERAL: PAINLEVEL: NO PAIN (0)

## 2022-01-24 NOTE — PROGRESS NOTES
"Follow-up after Male Incontinence Surgery    Thomas Simon is a 56 year old male with a history of prostate cancer s/p prostatectomy with radiation for LIZET and positive margin with recurrence on lupron, complicated by VUAS sp dilation with TUIBN, post prostatectomy incontinence. He is now s/p AUS placement on 12/30 and presents for activation.     He is doing well. Has mild pain from his abdominal incisions. No fever, dysuria, hematuria.     Exam:  Vitals: /86 (BP Location: Right arm, Patient Position: Sitting, Cuff Size: Adult Large)   Pulse 103   Ht 1.88 m (6' 2\")   Wt 139.3 kg (307 lb)   BMI 39.42 kg/m    BMI= Body mass index is 39.42 kg/m .  Perineal, RLQ and sub-inguinal wounds well healed  No tenderness or evidence of cellulitis  No hematoma  AUS pump palpable in right hemiscrotum.    His device was activated. We reviewed cycling of the device at length, using his pump and a model system. All questions answered.     Assessment and Plan:  Excellent outcome after artifical urinary sphincter placement.     Plan:   - Instructions given to avoid perineal pressure/bike riding for 6 months.   - Follow up with uro/onc for PSA for prostate cancer  - Follow up in PRN        " Topical Sulfur Applications Counseling: Topical Sulfur Counseling: Patient counseled that this medication may cause skin irritation or allergic reactions.  In the event of skin irritation, the patient was advised to reduce the amount of the drug applied or use it less frequently.   The patient verbalized understanding of the proper use and possible adverse effects of topical sulfur application.  All of the patient's questions and concerns were addressed.

## 2022-01-24 NOTE — NURSING NOTE
Chief Complaint   Patient presents with     RECHECK     AUS activation     - Shantell LLOYD, EMT  Urology Clinic

## 2022-06-20 ENCOUNTER — TELEPHONE (OUTPATIENT)
Dept: UROLOGY | Facility: CLINIC | Age: 57
End: 2022-06-20
Payer: COMMERCIAL

## 2022-06-26 ENCOUNTER — HEALTH MAINTENANCE LETTER (OUTPATIENT)
Age: 57
End: 2022-06-26

## 2022-07-12 ENCOUNTER — PRE VISIT (OUTPATIENT)
Dept: UROLOGY | Facility: CLINIC | Age: 57
End: 2022-07-12

## 2022-07-12 NOTE — TELEPHONE ENCOUNTER
Reason for visit: Six month check      Relevant information: AUS    Records/imaging/labs/orders: all records available    Pt called: no need for a call      Ale Sparrow CMA  7/12/2022  1:32 PM

## 2022-07-18 ENCOUNTER — VIRTUAL VISIT (OUTPATIENT)
Dept: UROLOGY | Facility: CLINIC | Age: 57
End: 2022-07-18
Payer: COMMERCIAL

## 2022-07-18 DIAGNOSIS — N39.3 MALE URINARY STRESS INCONTINENCE: Primary | ICD-10-CM

## 2022-07-18 PROCEDURE — 99213 OFFICE O/P EST LOW 20 MIN: CPT | Performed by: UROLOGY

## 2022-07-18 NOTE — LETTER
7/18/2022       RE: Thomas Simon  [address] [place] [id number]     Dear Colleague,    Thank you for referring your patient, Thomas Simon, to the [facility] [place] at [facility]. Please see a copy of my visit note below.    HPI:  Thomas Simon is a 57 year old male being seen for f/u after AUS.    prostate cancer s/p prostatectomy followed by EBRT and ADT with stress urinary incontinence refractory to minimally invasive management.     12/30/2021: AUS with 4cm cuff    He is mostly dry. If he sits on a hard chair he will have leakage. Wearing 1-2 small ppd.    No GH or UTIs     He reports a neg UA recently at [facility]    Exam:  There were no vitals taken for this visit.  GENERAL: Healthy, alert and no distress  EYES: Eyes grossly normal to inspection.  No discharge or erythema, or obvious scleral/conjunctival abnormalities.  RESP: No audible wheeze, cough, or visible cyanosis.  No visible retractions or increased work of breathing.    SKIN: Visible skin clear. No significant rash, abnormal pigmentation or lesions.  NEURO: Cranial nerves grossly intact.  Mentation and speech appropriate for age.  PSYCH: Mentation appears normal, affect normal/bright, judgement and insight intact, normal speech and appearance well-groomed.    Review of Imaging:  The following imaging exams were independently viewed and interpreted by me and discussed with patient:  none    Review of Labs:  The following labs were reviewed by me and discussed with the patient:  No results found for this or any previous visit (from the past 720 hour(s)).      Assessment & Plan   1. Excellent outcome after AUS -- f/u prn  2. He will f.u with Dr Ku about his Lupron mgmt.     Jack Larson MD  [facility] [place]      ==========================      Additional Coding Information:    Problems:  3 -- one stable chronic illness    Data  Reviewed     None    Level of risk:  3 -- low risk (e.g., OTC medication or observation, minor surgery without risks)    Time spent:  12 minutes spent on the date of the encounter doing chart review, history and exam, documentation and further activities per the note        Thomas is a 57 year old who is being evaluated via a billable video visit.      How would you like to obtain your AVS? Mail a copy  If the video visit is dropped, the invitation should be resent by: Text to cell phone: [phone]  Will anyone else be joining your video visit? No      Video-Visit Details    Video Start Time: 7:45  Type of service:  Video Visit  Video End Time:7:56 AM  Originating Location (pt. Location): Home  Distant Location (provider location):  [facility] [place]   Platform used for Video Visit: Vessel

## 2022-08-12 ENCOUNTER — PATIENT OUTREACH (OUTPATIENT)
Dept: UROLOGY | Facility: CLINIC | Age: 57
End: 2022-08-12

## 2022-09-14 ENCOUNTER — TELEPHONE (OUTPATIENT)
Dept: UROLOGY | Facility: CLINIC | Age: 57
End: 2022-09-14

## 2022-09-14 NOTE — TELEPHONE ENCOUNTER
Jack Larson MD Ray, Julie, RN  Cc: Stephanie Castillo LPN; Pallavi Abrams  I don t know what was communicated to the patient. Janneth please let him know his incontinence is not a disabling condition, especially now that it is fixed. If he has other disabling conditions he should speak With his PCP    Contacted patient to discuss. Informed him of above. Patient stated understanding and agreement with plan.  Stephanie Castillo LPN  Urology Clinic Service   [facility]

## 2022-11-21 ENCOUNTER — HEALTH MAINTENANCE LETTER (OUTPATIENT)
Age: 57
End: 2022-11-21

## 2022-12-21 ENCOUNTER — PRE VISIT (OUTPATIENT)
Dept: UROLOGY | Facility: CLINIC | Age: 57
End: 2022-12-21

## 2023-07-09 ENCOUNTER — HEALTH MAINTENANCE LETTER (OUTPATIENT)
Age: 58
End: 2023-07-09

## 2024-09-01 ENCOUNTER — HEALTH MAINTENANCE LETTER (OUTPATIENT)
Age: 59
End: 2024-09-01

## 2025-02-07 ENCOUNTER — TRANSFERRED RECORDS (OUTPATIENT)
Dept: HEALTH INFORMATION MANAGEMENT | Facility: CLINIC | Age: 60
End: 2025-02-07

## 2025-03-07 ENCOUNTER — TRANSFERRED RECORDS (OUTPATIENT)
Dept: HEALTH INFORMATION MANAGEMENT | Facility: CLINIC | Age: 60
End: 2025-03-07
Payer: COMMERCIAL

## 2025-08-28 ENCOUNTER — VIRTUAL VISIT (OUTPATIENT)
Dept: ONCOLOGY | Facility: CLINIC | Age: 60
End: 2025-08-28
Payer: COMMERCIAL

## 2025-08-28 ENCOUNTER — PRE VISIT (OUTPATIENT)
Dept: ONCOLOGY | Facility: CLINIC | Age: 60
End: 2025-08-28
Payer: COMMERCIAL

## 2025-08-28 DIAGNOSIS — Z80.3 FAMILY HISTORY OF MALIGNANT NEOPLASM OF BREAST: ICD-10-CM

## 2025-08-28 DIAGNOSIS — Z15.03 MUTATION IN HOXB13 GENE: Primary | ICD-10-CM

## 2025-08-28 DIAGNOSIS — C61 MALIGNANT NEOPLASM OF PROSTATE (H): ICD-10-CM

## 2025-08-28 PROCEDURE — 96041 GENETIC COUNSELING SVC EA 30: CPT | Mod: GT,95

## (undated) DEVICE — PANTIES MESH LG/XLG 2PK 706M2

## (undated) DEVICE — SU VICRYL 4-0 RB-1 27" UND J214H

## (undated) DEVICE — DRAPE POUCH INSTRUMENT 1018

## (undated) DEVICE — STPL SKIN 35W ROTATING HEAD PRW35

## (undated) DEVICE — DRAPE LEGGINGS CLEAR 8430

## (undated) DEVICE — PREP CHLORAPREP 26ML TINTED ORANGE  260815

## (undated) DEVICE — SU VICRYL 0 UR-6 27" J603H

## (undated) DEVICE — DRAPE SHEET MED 44X70" 9355

## (undated) DEVICE — SYR 10ML LL W/O NDL 302995

## (undated) DEVICE — DRAPE LAVH/LAPAROSCOPY W/POUCH 29474

## (undated) DEVICE — TUBING SUCTION 10'X3/16" N510

## (undated) DEVICE — Device

## (undated) DEVICE — PITCHER STERILE 1000ML  SSK9004A

## (undated) DEVICE — DRSG GAUZE 4X4" TRAY 6939

## (undated) DEVICE — SUTURE BOOTS 051003PBX

## (undated) DEVICE — JELLY LUBRICATING SURGILUBE 2OZ TUBE

## (undated) DEVICE — PACK GOWN 3/PK DISP XL SBA32GPFCB

## (undated) DEVICE — DRAPE MAYO STAND 23X54 8337

## (undated) DEVICE — SYR 30ML LL W/O NDL 302832

## (undated) DEVICE — TUBING IRRIG CYSTO/BLADDER SET 81" LF 2C4040

## (undated) DEVICE — ESU GROUND PAD ADULT W/CORD E7507

## (undated) DEVICE — PAD CHUX UNDERPAD 23X24" 7136

## (undated) DEVICE — RX BACITRACIN OINTMENT 0.9G 1/32OZ CUR001109

## (undated) DEVICE — CATH FOLEY 12FR 5ML SILICONE LUBRI-SIL 175812

## (undated) DEVICE — ESU CORD BIPOLAR GREEN 10-4000

## (undated) DEVICE — LINEN TOWEL PACK X5 5464

## (undated) DEVICE — DRAPE SHEET REV FOLD 3/4 9349

## (undated) DEVICE — SOL WATER IRRIG 1000ML BOTTLE 2F7114

## (undated) DEVICE — SUCTION MANIFOLD NEPTUNE 2 SYS 4 PORT 0702-020-000

## (undated) DEVICE — LINEN TOWEL PACK X6 WHITE 5487

## (undated) DEVICE — SU MONOCRYL 4-0 PS-2 18" UND Y496G

## (undated) DEVICE — SU VICRYL 3-0 SH 27" UND J416H

## (undated) DEVICE — LINEN GOWN XLG 5407

## (undated) DEVICE — ADH SKIN CLOSURE PREMIERPRO EXOFIN 1.0ML 3470

## (undated) DEVICE — BLADE CLIPPER SGL USE 9680

## (undated) RX ORDER — HYDROMORPHONE HCL IN WATER/PF 6 MG/30 ML
PATIENT CONTROLLED ANALGESIA SYRINGE INTRAVENOUS
Status: DISPENSED
Start: 2021-12-30

## (undated) RX ORDER — FENTANYL CITRATE 50 UG/ML
INJECTION, SOLUTION INTRAMUSCULAR; INTRAVENOUS
Status: DISPENSED
Start: 2021-12-30

## (undated) RX ORDER — OXYCODONE HYDROCHLORIDE 5 MG/1
TABLET ORAL
Status: DISPENSED
Start: 2021-12-30

## (undated) RX ORDER — HYDROMORPHONE HYDROCHLORIDE 1 MG/ML
INJECTION, SOLUTION INTRAMUSCULAR; INTRAVENOUS; SUBCUTANEOUS
Status: DISPENSED
Start: 2021-12-30

## (undated) RX ORDER — CEFAZOLIN SODIUM IN 0.9 % NACL 3 G/100 ML
INTRAVENOUS SOLUTION, PIGGYBACK (ML) INTRAVENOUS
Status: DISPENSED
Start: 2021-12-30

## (undated) RX ORDER — ACETAMINOPHEN 325 MG/1
TABLET ORAL
Status: DISPENSED
Start: 2021-12-30

## (undated) RX ORDER — CEFAZOLIN SODIUM 2 G/100ML
INJECTION, SOLUTION INTRAVENOUS
Status: DISPENSED
Start: 2021-12-30